# Patient Record
Sex: FEMALE | Race: WHITE | NOT HISPANIC OR LATINO | Employment: FULL TIME | URBAN - METROPOLITAN AREA
[De-identification: names, ages, dates, MRNs, and addresses within clinical notes are randomized per-mention and may not be internally consistent; named-entity substitution may affect disease eponyms.]

---

## 2019-07-31 ENCOUNTER — TELEPHONE (OUTPATIENT)
Dept: INTERNAL MEDICINE | Facility: CLINIC | Age: 44
End: 2019-07-31

## 2019-12-31 ENCOUNTER — OFFICE VISIT (OUTPATIENT)
Dept: ENDOCRINOLOGY | Facility: CLINIC | Age: 44
End: 2019-12-31

## 2019-12-31 VITALS
HEART RATE: 92 BPM | SYSTOLIC BLOOD PRESSURE: 122 MMHG | DIASTOLIC BLOOD PRESSURE: 84 MMHG | BODY MASS INDEX: 26.23 KG/M2 | WEIGHT: 157.4 LBS | OXYGEN SATURATION: 98 % | HEIGHT: 65 IN

## 2019-12-31 DIAGNOSIS — E04.2 MULTIPLE THYROID NODULES: Primary | ICD-10-CM

## 2019-12-31 DIAGNOSIS — E03.8 CENTRAL HYPOTHYROIDISM: ICD-10-CM

## 2019-12-31 PROBLEM — L65.9 HAIR LOSS: Status: ACTIVE | Noted: 2019-12-31

## 2019-12-31 PROBLEM — Z79.890 POSTMENOPAUSAL HRT (HORMONE REPLACEMENT THERAPY): Status: ACTIVE | Noted: 2019-12-31

## 2019-12-31 LAB — T4 FREE SERPL-MCNC: 1.39 NG/DL (ref 0.93–1.7)

## 2019-12-31 PROCEDURE — 76536 US EXAM OF HEAD AND NECK: CPT | Performed by: INTERNAL MEDICINE

## 2019-12-31 PROCEDURE — 99214 OFFICE O/P EST MOD 30 MIN: CPT | Performed by: INTERNAL MEDICINE

## 2019-12-31 PROCEDURE — 84439 ASSAY OF FREE THYROXINE: CPT | Performed by: INTERNAL MEDICINE

## 2019-12-31 RX ORDER — SPIRONOLACTONE 100 MG/1
TABLET, FILM COATED ORAL
COMMUNITY
Start: 2019-11-22

## 2019-12-31 RX ORDER — ESTRADIOL 1 MG/1
TABLET ORAL
COMMUNITY
Start: 2019-10-10

## 2019-12-31 RX ORDER — LEVOTHYROXINE SODIUM 0.07 MG/1
TABLET ORAL
COMMUNITY
Start: 2019-11-22 | End: 2020-01-02 | Stop reason: SDUPTHER

## 2019-12-31 NOTE — PROGRESS NOTES
Chief Complaint   Patient presents with   • Thyroid Problem     Previous pt here today for follow up        HPI   Laurel Bob is a 44 y.o. female had concerns including Thyroid Problem (Previous pt here today for follow up).    Patient is here for follow-up on idiopathic central hypothyroidism and thyroid nodules.    Her last thyroid ultrasound was completed on 12/31/2018 and showed a right mid 1.8 x 1.3 x 1.4 cm nodule in the right lower 1.1 x 0.9 x 1.0 cm nodule.  These nodules were biopsied in 2017 and were benign.  She has a history of left lobectomy for benign nodules.    Currently she is on levothyroxine 75 mcg daily for central hypothyroidism.  Free T4 is due.  Patient had a complete evaluation including free T4 by dialysis which was slightly low with an inappropriately normal TSH.  Labs from 12/18/2018 showed TSH 1.76 with free T4 by dialysis minimally below normal at 1.0 (lower limit 1.1).  She had symptoms of hypothyroidism prior to being started on levothyroxine.  Other pituitary work-up including estradiol, FSH, LH, IGF-I, prolactin, cortisol, ACTH was all normal.  MRI was also normal    The following portions of the patient's history were reviewed and updated as appropriate: allergies, current medications, past family history, past medical history, past social history, past surgical history and problem list.    Review of Systems   Constitutional: Negative.    HENT: Positive for congestion and sinus pressure.    Eyes: Negative.    Respiratory: Negative.    Cardiovascular: Negative.    Gastrointestinal: Negative.    Endocrine: Negative.    Genitourinary: Negative.    Musculoskeletal: Positive for arthralgias, myalgias and neck stiffness.   Skin: Negative.    Allergic/Immunologic: Negative.    Neurological: Negative.    Hematological: Negative.    Psychiatric/Behavioral: Negative for depressed mood. The patient is nervous/anxious.       ROS was reviewed and verified. All other ROS negative.    BP  "122/84 (BP Location: Left arm, Patient Position: Sitting, Cuff Size: Adult)   Pulse 92   Ht 165.1 cm (65\")   Wt 71.4 kg (157 lb 6.4 oz)   SpO2 98%   Breastfeeding No   BMI 26.19 kg/m²      Physical Exam     Constitutional:  well developed; well nourished  no acute distress   ENT/Thyroid: no thyromegaly  no palpable nodules   Eyes: EOM intact  Conjunctiva: clear   Respiratory:  breathing is unlabored  clear to auscultation bilaterally   Cardiovascular:  regular rate and rhythm, S1, S2 normal, no murmur, click, rub or gallop   Chest:  Not performed.   Abdomen: Not performed.   : Not performed.   Musculoskeletal: negative findings:  ROM of all joints is normal, no deformities present   Skin: dry and warm   Neuro: normal without focal findings, mental status, speech normal, alert and oriented x3 and YENY   Psych: oriented to time, place and person, mood and affect are within normal limits     See HPI    Thyroid Ultrasound 12/31/19    Indication: Right lobe nodules  Comparison Imaging: Ultrasound report from 12/31/2018  Clinical History:  Left lobectomy 2003 for benign nodules, two right nodules biopsied in 2017 and were benign    Real time high resolution imaging of the thyroid gland was performed in transverse and longitudinal planes. Previous imaging reports were reviewed if available and compared to the current to assess stability.     Lobes:  The right lobe measured 4.4 cm L x 1.0 cm AP x 1.7 cm in TV dimension.    The left lobe and isthmus are surgically absent.    Thyroid gland is heterogeneous and contains multiple nodules.    Nodule 1   located in the right mid lobe and measures 0.9 x 0.6 x 1.0 cm (L X AP X TV).  This nodule is complex cystic, heterogeneous, hypoechoic with well-defined margins, and Grade II vascularity on Color Flow Doppler.  It has no artifacts    Nodule 2  is located in the right lower lobe and measures 1.8 x 0.8 x 1.1 cm (L X AP X TV).  This nodule is complex cystic, heterogeneous, " hypoechoic with well-defined margins, and Grade II vascularity on Color Flow Doppler.  It has no artifacts    These 2 nodules are nearly confluent on the current exam.    No pathologic lymph nodes were seen.    Impression:  Stable right lobe nodules measuring 0.9 x 0.6 x 1.0 (previously measured 1.1 x 0.9 x 1.0 cm) and right lower nodule measuring 1.8 x 0.8 x 1.1 cm (previously measured 1.8 x 1.3 x 1.4 cm).  Prior benign FNA of both nodules in 2017.     Recommendation:  Repeat ultrasound 1 year.      Assessment and Plan    Laurel was seen today for thyroid problem.    Diagnoses and all orders for this visit:    Multiple thyroid nodules  As above, stable right lobe nodules that they do appear to be more confluent on this year's exam than in prior years.  They have slightly decreased in size.  Benign FNA of both nodules in 2017.  Repeat ultrasound in 1 year.  -     US Thyroid    Central hypothyroidism  Idiopathic central hypothyroidism diagnosed based on an inappropriately normal TSH with a low free T4 (confirmed with free T4 by dialysis).  Patient had symptoms of hypothyroidism prior to treatment with levothyroxine and feels better on treatment.  Other pituitary evaluation including MRI was normal.  Continue monitoring free T4 at least yearly.  TSH should not be checked or used in the management of this patient's hypothyroidism.  -     T4, Free    Return in about 1 year (around 12/31/2020) for next scheduled follow up with thyroid ultrasound. The patient was instructed to contact the clinic with any interval questions or concerns.    Rosangela Chu, DO   Endocrinologist    Please note that portions of this document were completed with a voice recognition program. Efforts were made to edit the dictations, but occasionally words are mis-transcribed.

## 2020-01-02 DIAGNOSIS — E03.8 CENTRAL HYPOTHYROIDISM: Primary | ICD-10-CM

## 2020-01-02 RX ORDER — LEVOTHYROXINE SODIUM 0.07 MG/1
75 TABLET ORAL DAILY
Qty: 90 TABLET | Refills: 3 | Status: SHIPPED | OUTPATIENT
Start: 2020-01-02 | End: 2021-03-03

## 2020-09-03 ENCOUNTER — TELEPHONE (OUTPATIENT)
Dept: ENDOCRINOLOGY | Facility: CLINIC | Age: 45
End: 2020-09-03

## 2020-09-03 ENCOUNTER — OFFICE VISIT (OUTPATIENT)
Dept: ENDOCRINOLOGY | Facility: CLINIC | Age: 45
End: 2020-09-03

## 2020-09-03 VITALS
HEART RATE: 67 BPM | SYSTOLIC BLOOD PRESSURE: 126 MMHG | OXYGEN SATURATION: 98 % | BODY MASS INDEX: 27.36 KG/M2 | HEIGHT: 65 IN | WEIGHT: 164.2 LBS | DIASTOLIC BLOOD PRESSURE: 76 MMHG

## 2020-09-03 DIAGNOSIS — E04.2 MULTIPLE THYROID NODULES: Primary | ICD-10-CM

## 2020-09-03 DIAGNOSIS — E03.8 CENTRAL HYPOTHYROIDISM: ICD-10-CM

## 2020-09-03 PROCEDURE — 99214 OFFICE O/P EST MOD 30 MIN: CPT | Performed by: INTERNAL MEDICINE

## 2020-09-03 PROCEDURE — 76536 US EXAM OF HEAD AND NECK: CPT | Performed by: INTERNAL MEDICINE

## 2020-09-03 RX ORDER — LANSOPRAZOLE 15 MG/1
CAPSULE, DELAYED RELEASE ORAL
COMMUNITY
Start: 2020-08-31

## 2020-09-03 NOTE — PROGRESS NOTES
"Chief Complaint   Patient presents with   • Multiple Thyroid Nodules     Follow up & U/S        HPI   Laurel Bob is a 45 y.o. female had concerns including Multiple Thyroid Nodules (Follow up & U/S).      Pt had a left thyroidectomy and later was diagnosed with central hypothyroidism with inappropriately normal TSH in the setting of low free T4.     Symptoms of hypothyroidism improved once levothyroxine was started.   PCP checked labs recently and showed free T4 1.58. TSH was also checked and because it was low PCP recommended dose reduction.     She is here for repeat thyroid ultrasound today.   Has right lobe nodules that were biopsied in 2017 and were benign.     The following portions of the patient's history were reviewed and updated as appropriate: allergies, current medications, past family history, past medical history, past social history, past surgical history and problem list.    Review of Systems   Constitutional: Positive for fatigue and unexpected weight gain.   HENT: Negative.    Eyes: Negative.    Respiratory: Negative.    Cardiovascular: Negative.    Gastrointestinal: Positive for GERD.   Endocrine: Negative.    Genitourinary: Negative.    Musculoskeletal: Negative.    Skin: Negative.    Allergic/Immunologic: Negative.    Neurological: Negative.    Hematological: Negative.    Psychiatric/Behavioral: Positive for stress.      ROS was reviewed and verified. All other ROS negative.    /76   Pulse 67   Ht 165.1 cm (65\")   Wt 74.5 kg (164 lb 3.2 oz)   SpO2 98%   BMI 27.32 kg/m²      Physical Exam     Constitutional:  well developed; well nourished  no acute distress   ENT/Thyroid: no thyromegaly, left lobe surgically absent  no palpable nodules   Eyes: EOM intact  Conjunctiva: clear   Respiratory:  breathing is unlabored  clear to auscultation bilaterally   Cardiovascular:  regular rate and rhythm, S1, S2 normal, no murmur, click, rub or gallop   Chest:  Not performed.   Abdomen: Not " performed.   : Not performed.   Musculoskeletal: negative findings:  ROM of all joints is normal, no deformities present   Skin: dry and warm   Neuro: normal without focal findings and mental status, speech normal, alert and oriented x3   Psych: oriented to time, place and person, mood and affect are within normal limits       T4  Lab Results   Component Value Date    FREET4 1.39 12/31/2019       Thyroid Ultrasound 09/03/20    Indication: thyroid nodules  Comparison Imaging: US 12/2019  Clinical History: left lobectomy, right lobe nodules biopsied and benign 2017    Real time high resolution imaging of the thyroid gland was performed in transverse and longitudinal planes. Previous imaging reports were reviewed if available and compared to the current to assess stability.     Lobes:  The right lobe measured 4.3 cm L x 1.2 cm AP x 1.7 cm in TV dimension.    The isthmus measured 0.1 cm in thickness.    The left thyroid lobe is surgically absent and no remnant thyroid tissue is noted.    Thyroid gland is heterogeneous and contains a single right lobe nodule.    Nodule 1   located in the right mid lobe and measures 1.8 x 1.1 x 1.3 cm (L X AP X TV).  This nodule is complex cystic, heterogeneous, hypoechoic with well-defined margins, and Grade II vascularity on Color Flow Doppler.  It has no artifacts.    No pathologic lymph nodes were seen.    Impression:  Right lobe nodule complex solid and cystic measuring 1.8 x 1.1 x 1.3 cm.  What was previously measuring as 2 separate nodules in the right lobe now appears to be 1 congruent nodule.  In hindsight, the 2 separate nodules may have been due to central septation in a larger nodule that has since decreased in size.    Recommendation:  Repeat ultrasound in 1 year      Assessment and Plan    Laurel was seen today for multiple thyroid nodules.    Diagnoses and all orders for this visit:    Multiple thyroid nodules  See detailed ultrasound report as above.  She now has only a  single right lobe nodule measuring 1.8 x 1.1 x 1.3 cm.  Previously had 2 right lobe nodules which in hindsight may have been a single nodule with a thick septation giving the appearance of 2 nodules.  Despite that, the nodule is measuring smaller than the 2 nodules would have together and measures similarly to the largest of the two nodules previously. Those two nodules were biopsied in 2017 and were benign.  No repeat FNA is indicated.   Repeat US 1 year. If stable, decrease frequency of monitoring.  -     US Thyroid    Central hypothyroidism  Patient has a history of central hypothyroidism with inappropriately normal TSH in the setting of low T4 with symptoms of hypothyroidism, resolved on treatment.  Work-up at that time included evaluation of all of the pituitary axes and MRI, which were all normal.  It would appear she has idiopathic central hypothyroidism.  TSH can sometimes suppress with initiation of T4 therapy, although it may not respond normally in the setting of low T4.  Her TSH should not be monitored or checked and hypothyroidism should be managed solely based on free T4 levels.  Clinically the patient displays no symptoms of hyperthyroidism.  Continue levothyroxine 75 mcg daily and recheck free T4 only yearly.    Return in about 1 year (around 9/3/2021) for next scheduled follow up with thyroid ultrasound. The patient was instructed to contact the clinic with any interval questions or concerns.    Rosangela Chu, DO   Endocrinologist    Please note that portions of this document were completed with a voice recognition program. Efforts were made to edit the dictations, but occasionally words are mis-transcribed.

## 2020-09-04 ENCOUNTER — TELEPHONE (OUTPATIENT)
Dept: ENDOCRINOLOGY | Facility: CLINIC | Age: 45
End: 2020-09-04

## 2020-09-04 NOTE — TELEPHONE ENCOUNTER
Fax request to:  Sunitha Flores NP  Fax:  [phone], Phone:  [phone]    Requested:  Recent labs from July, 2020

## 2020-09-04 NOTE — TELEPHONE ENCOUNTER
----- Message from Rosangela Chu DO sent at 9/3/2020  3:48 PM EDT -----  Regarding: labs   Please request labs from July from PCPs office. TY!

## 2020-09-04 NOTE — TELEPHONE ENCOUNTER
Spoke with Indy, from Medical records at [facility].  She will fax us labs for patient from July, 2020.  Phone:  [phone]

## 2020-09-04 NOTE — TELEPHONE ENCOUNTER
Indy states, pt does not have labs just two covid-19 tests.  Patient has not seen Sunitha Li since 2007.

## 2020-09-08 NOTE — TELEPHONE ENCOUNTER
Mary from medical records at [facility] will fax the labs again to us:  Provided fax:  [phone]

## 2020-09-08 NOTE — TELEPHONE ENCOUNTER
Spoke with Mary, from Medical records at [facility].  She will fax us labs for patient from July, 2020.  Phone:  [phone]

## 2020-09-25 NOTE — TELEPHONE ENCOUNTER
Spoke with Liam, from medical records, he will send the labs they have for patient, but they are from July of 2019.

## 2021-03-03 DIAGNOSIS — E03.8 CENTRAL HYPOTHYROIDISM: ICD-10-CM

## 2021-03-03 RX ORDER — LEVOTHYROXINE SODIUM 0.07 MG/1
TABLET ORAL
Qty: 90 TABLET | Refills: 2 | Status: SHIPPED | OUTPATIENT
Start: 2021-03-03 | End: 2021-08-12 | Stop reason: SDUPTHER

## 2021-05-03 DIAGNOSIS — E03.8 CENTRAL HYPOTHYROIDISM: Primary | ICD-10-CM

## 2021-08-12 ENCOUNTER — OFFICE VISIT (OUTPATIENT)
Dept: ENDOCRINOLOGY | Facility: CLINIC | Age: 46
End: 2021-08-12

## 2021-08-12 VITALS
HEIGHT: 64 IN | BODY MASS INDEX: 27.31 KG/M2 | OXYGEN SATURATION: 98 % | DIASTOLIC BLOOD PRESSURE: 68 MMHG | HEART RATE: 84 BPM | SYSTOLIC BLOOD PRESSURE: 114 MMHG | WEIGHT: 160 LBS

## 2021-08-12 DIAGNOSIS — E04.1 THYROID NODULE: ICD-10-CM

## 2021-08-12 DIAGNOSIS — E11.9 TYPE 2 DIABETES MELLITUS WITHOUT COMPLICATION, WITHOUT LONG-TERM CURRENT USE OF INSULIN (HCC): ICD-10-CM

## 2021-08-12 DIAGNOSIS — E03.8 CENTRAL HYPOTHYROIDISM: Primary | ICD-10-CM

## 2021-08-12 PROCEDURE — 76536 US EXAM OF HEAD AND NECK: CPT | Performed by: INTERNAL MEDICINE

## 2021-08-12 PROCEDURE — 99214 OFFICE O/P EST MOD 30 MIN: CPT | Performed by: INTERNAL MEDICINE

## 2021-08-12 RX ORDER — LEVOTHYROXINE SODIUM 0.07 MG/1
75 TABLET ORAL DAILY
Qty: 90 TABLET | Refills: 3 | Status: SHIPPED | OUTPATIENT
Start: 2021-08-12 | End: 2022-08-11 | Stop reason: SDUPTHER

## 2022-08-09 ENCOUNTER — LAB (OUTPATIENT)
Dept: LAB | Facility: HOSPITAL | Age: 47
End: 2022-08-09

## 2022-08-09 ENCOUNTER — OFFICE VISIT (OUTPATIENT)
Dept: ENDOCRINOLOGY | Facility: CLINIC | Age: 47
End: 2022-08-09

## 2022-08-09 VITALS
OXYGEN SATURATION: 98 % | HEIGHT: 64 IN | HEART RATE: 78 BPM | SYSTOLIC BLOOD PRESSURE: 116 MMHG | DIASTOLIC BLOOD PRESSURE: 80 MMHG | BODY MASS INDEX: 28.68 KG/M2 | WEIGHT: 168 LBS

## 2022-08-09 DIAGNOSIS — L65.9 HAIR LOSS: ICD-10-CM

## 2022-08-09 DIAGNOSIS — E11.9 TYPE 2 DIABETES MELLITUS WITHOUT COMPLICATION, WITHOUT LONG-TERM CURRENT USE OF INSULIN: ICD-10-CM

## 2022-08-09 DIAGNOSIS — E04.1 THYROID NODULE: ICD-10-CM

## 2022-08-09 DIAGNOSIS — E03.8 CENTRAL HYPOTHYROIDISM: Primary | ICD-10-CM

## 2022-08-09 LAB — HBA1C MFR BLD: 5.8 % (ref 4.8–5.6)

## 2022-08-09 PROCEDURE — 99214 OFFICE O/P EST MOD 30 MIN: CPT | Performed by: INTERNAL MEDICINE

## 2022-08-09 PROCEDURE — 83036 HEMOGLOBIN GLYCOSYLATED A1C: CPT | Performed by: INTERNAL MEDICINE

## 2022-08-09 PROCEDURE — 84439 ASSAY OF FREE THYROXINE: CPT | Performed by: INTERNAL MEDICINE

## 2022-08-09 NOTE — PROGRESS NOTES
"Chief Complaint   Patient presents with   • Hypothyroidism        HPI   Laurel Bob is a 46 y.o. female had concerns including Hypothyroidism.      Having occasional hot flashes.   Preferred the topical estrogen.   Sees Crystal Brown.    Weight is up 8 lbs since last year.   Isn't following her diet as closely.   Sleep is good.   Is due for A1c.    Is doing intermittent fasting - until lunch.   Notes worsening GERD with sugar or water. Recalls being told she had a hiatal hernia.   Didn't tolerate metformin in the past.     Still having issues with hair loss. Is on spironolactone.    Is on levothyroxine 75 mcg daily. Taking as directed with no missed doses.    The following portions of the patient's history were reviewed and updated as appropriate: allergies, current medications, past family history, past medical history, past social history, past surgical history and problem list.    Review of Systems   Constitutional: Negative.    Endocrine: Negative.         Hair loss, hot flashes        /80   Pulse 78   Ht 162.6 cm (64\")   Wt 76.2 kg (168 lb)   SpO2 98%   BMI 28.84 kg/m²      Physical Exam  Vitals reviewed.   Constitutional:       Appearance: Normal appearance.   Neck:      Thyroid: No thyroid mass or thyromegaly.   Cardiovascular:      Rate and Rhythm: Normal rate.   Pulmonary:      Effort: Pulmonary effort is normal.   Neurological:      General: No focal deficit present.      Mental Status: She is alert. Mental status is at baseline.   Psychiatric:         Mood and Affect: Mood normal.         Behavior: Behavior normal.          LABS AND IMAGING     T4  Lab Results   Component Value Date    FREET4 1.39 12/31/2019     Thyroid Ultrasound 08/12/21     Indication: Right thyroid nodule  Comparison Imaging: Ultrasound September 2020  Clinical History:  Left lobectomy for thyroid nodules, right nodule (previously thought to be 2 distinct nodules in parentheses biopsied in 2017 and found to be " benign     Real time high resolution imaging of the thyroid gland was performed in transverse and longitudinal planes. Previous imaging reports were reviewed if available and compared to the current to assess stability.      Lobes:  The right lobe measured 5.0 cm L x 1.2 cm AP x 1.5 cm in TV dimension.    The left thyroid lobe is surgically absent     Thyroid gland is heterogeneous and contains a single right lobe nodule.     Nodule 1   located in the right mid lobe and measures 1.5 x 1.0 x 1.1 cm (L X AP X TV).  This nodule is complex cystic, heterogeneous, hypoechoic with well-defined margins, and Grade II vascularity on Color Flow Doppler.  It has no artifacts.  This nodule previously measured 1.8 x 1.1 x 1.3 cm in 2020.     No pathologic lymph nodes were seen.     Impression:  Right lobe nodule decreasing in size now measuring 1.5 x 1.0 x 1.1 cm.  This nodule previously measured 1.8 x 1.1 x 1.3 cm.  Was biopsied as 2 separate nodules in 2017.     Recommendation:  Repeat ultrasound in 2 years.         Assessment and Plan    Diagnoses and all orders for this visit:    1. Central hypothyroidism (Primary)  Clinically thyroid on levothyroxine 75 mcg daily.  Check free T4 today.  -     T4, Free    2. Thyroid nodule  Right lobe nodule decreased in size in 2021 measuring 1.5 x 1.0 x 1.1 cm.  Was biopsied when suspected to be 2 separate nodules in 2017 and was found to be benign.  Repeat ultrasound next year.    3. Type 2 diabetes mellitus without complication, without long-term current use of insulin (HCC)  A1c 6.5 when last checked.  Recheck A1c today.  History of intolerance to metformin.  No GLP-1 agonist due to uncontrolled reflux.  Exercise/dietary modification/weight loss discussed.  -     Hemoglobin A1c    4. Hair loss  On spironolactone. Checking thyroid as above. PT is not taking biotin.     Return in about 1 year (around 8/9/2023) for next scheduled follow up, with thyroid ultrasound ** 30 min appt. The  patient was instructed to contact the clinic with any interval questions or concerns.    Rosangela Chu, DO   Endocrinologist    Please note that portions of this note were completed with a voice recognition program.

## 2022-08-10 ENCOUNTER — TELEPHONE (OUTPATIENT)
Dept: ENDOCRINOLOGY | Facility: CLINIC | Age: 47
End: 2022-08-10

## 2022-08-10 DIAGNOSIS — E03.8 CENTRAL HYPOTHYROIDISM: ICD-10-CM

## 2022-08-10 LAB — T4 FREE SERPL-MCNC: 1.26 NG/DL (ref 0.93–1.7)

## 2022-08-10 NOTE — TELEPHONE ENCOUNTER
PATIENT RETURNED CALL REGARDING THYROID MED DOSE INCREASE SUGGESTION TO 88 MCG. PATIENT IS AGREEABLE WITH THIS INCREASE AND WOULD LIKE TO HAVE RX SENT TO [facility].

## 2022-08-11 RX ORDER — LEVOTHYROXINE SODIUM 88 UG/1
88 TABLET ORAL DAILY
Qty: 90 TABLET | Refills: 1 | Status: SHIPPED | OUTPATIENT
Start: 2022-08-11 | End: 2023-02-10

## 2022-09-10 DIAGNOSIS — E03.8 CENTRAL HYPOTHYROIDISM: ICD-10-CM

## 2022-09-12 RX ORDER — LEVOTHYROXINE SODIUM 0.07 MG/1
TABLET ORAL
Qty: 90 TABLET | Refills: 3 | OUTPATIENT
Start: 2022-09-12

## 2023-02-10 DIAGNOSIS — E03.8 CENTRAL HYPOTHYROIDISM: ICD-10-CM

## 2023-02-10 RX ORDER — LEVOTHYROXINE SODIUM 88 UG/1
TABLET ORAL
Qty: 90 TABLET | Refills: 2 | Status: SHIPPED | OUTPATIENT
Start: 2023-02-10

## 2023-08-03 ENCOUNTER — OFFICE VISIT (OUTPATIENT)
Dept: ENDOCRINOLOGY | Facility: CLINIC | Age: 48
End: 2023-08-03
Payer: COMMERCIAL

## 2023-08-03 VITALS
HEART RATE: 92 BPM | DIASTOLIC BLOOD PRESSURE: 72 MMHG | HEIGHT: 64 IN | WEIGHT: 175 LBS | OXYGEN SATURATION: 99 % | BODY MASS INDEX: 29.88 KG/M2 | SYSTOLIC BLOOD PRESSURE: 122 MMHG

## 2023-08-03 DIAGNOSIS — R73.03 PREDIABETES: ICD-10-CM

## 2023-08-03 DIAGNOSIS — E03.8 CENTRAL HYPOTHYROIDISM: Primary | ICD-10-CM

## 2023-08-03 DIAGNOSIS — E04.1 THYROID NODULE: ICD-10-CM

## 2023-08-03 LAB
ALBUMIN SERPL-MCNC: 4.1 G/DL (ref 3.5–5.2)
ALBUMIN UR-MCNC: 28.2 MG/DL
ALBUMIN/GLOB SERPL: 1.4 G/DL
ALP SERPL-CCNC: 86 U/L (ref 39–117)
ALT SERPL W P-5'-P-CCNC: 17 U/L (ref 1–33)
ANION GAP SERPL CALCULATED.3IONS-SCNC: 13.6 MMOL/L (ref 5–15)
AST SERPL-CCNC: 18 U/L (ref 1–32)
BILIRUB SERPL-MCNC: 0.3 MG/DL (ref 0–1.2)
BUN SERPL-MCNC: 8 MG/DL (ref 6–20)
BUN/CREAT SERPL: 8.2 (ref 7–25)
CALCIUM SPEC-SCNC: 9.5 MG/DL (ref 8.6–10.5)
CHLORIDE SERPL-SCNC: 102 MMOL/L (ref 98–107)
CO2 SERPL-SCNC: 25.4 MMOL/L (ref 22–29)
CREAT SERPL-MCNC: 0.97 MG/DL (ref 0.57–1)
CREAT UR-MCNC: 106.6 MG/DL
EGFRCR SERPLBLD CKD-EPI 2021: 72.7 ML/MIN/1.73
GLOBULIN UR ELPH-MCNC: 2.9 GM/DL
GLUCOSE SERPL-MCNC: 113 MG/DL (ref 65–99)
HBA1C MFR BLD: 6 % (ref 4.8–5.6)
MICROALBUMIN/CREAT UR: 264.5 MG/G
POTASSIUM SERPL-SCNC: 3.8 MMOL/L (ref 3.5–5.2)
PROT SERPL-MCNC: 7 G/DL (ref 6–8.5)
SODIUM SERPL-SCNC: 141 MMOL/L (ref 136–145)
T4 FREE SERPL-MCNC: 1.3 NG/DL (ref 0.93–1.7)

## 2023-08-03 PROCEDURE — 82043 UR ALBUMIN QUANTITATIVE: CPT | Performed by: INTERNAL MEDICINE

## 2023-08-03 PROCEDURE — 80053 COMPREHEN METABOLIC PANEL: CPT | Performed by: INTERNAL MEDICINE

## 2023-08-03 PROCEDURE — 84439 ASSAY OF FREE THYROXINE: CPT | Performed by: INTERNAL MEDICINE

## 2023-08-03 PROCEDURE — 82570 ASSAY OF URINE CREATININE: CPT | Performed by: INTERNAL MEDICINE

## 2023-08-03 PROCEDURE — 83036 HEMOGLOBIN GLYCOSYLATED A1C: CPT | Performed by: INTERNAL MEDICINE

## 2023-08-07 DIAGNOSIS — E03.8 CENTRAL HYPOTHYROIDISM: ICD-10-CM

## 2023-08-07 RX ORDER — LEVOTHYROXINE SODIUM 88 UG/1
88 TABLET ORAL DAILY
Qty: 90 TABLET | Refills: 3 | Status: SHIPPED | OUTPATIENT
Start: 2023-08-07

## 2024-08-01 ENCOUNTER — OFFICE VISIT (OUTPATIENT)
Dept: ENDOCRINOLOGY | Facility: CLINIC | Age: 49
End: 2024-08-01
Payer: COMMERCIAL

## 2024-08-01 VITALS
OXYGEN SATURATION: 100 % | HEART RATE: 74 BPM | HEIGHT: 64 IN | WEIGHT: 156 LBS | BODY MASS INDEX: 26.63 KG/M2 | DIASTOLIC BLOOD PRESSURE: 76 MMHG | SYSTOLIC BLOOD PRESSURE: 124 MMHG

## 2024-08-01 DIAGNOSIS — E03.8 CENTRAL HYPOTHYROIDISM: Primary | ICD-10-CM

## 2024-08-01 DIAGNOSIS — R73.03 PREDIABETES: ICD-10-CM

## 2024-08-01 DIAGNOSIS — Z79.890 POSTMENOPAUSAL HRT (HORMONE REPLACEMENT THERAPY): ICD-10-CM

## 2024-08-01 DIAGNOSIS — E04.1 THYROID NODULE: ICD-10-CM

## 2024-08-01 PROCEDURE — 99214 OFFICE O/P EST MOD 30 MIN: CPT | Performed by: INTERNAL MEDICINE

## 2024-08-01 RX ORDER — ESTRADIOL 2 MG/1
2 TABLET ORAL DAILY
COMMUNITY
Start: 2024-04-28

## 2024-08-01 RX ORDER — AZELASTINE HYDROCHLORIDE 137 UG/1
SPRAY, METERED NASAL
COMMUNITY
Start: 2024-04-23

## 2024-08-01 RX ORDER — ONDANSETRON 4 MG/1
4 TABLET, ORALLY DISINTEGRATING ORAL
COMMUNITY
Start: 2024-07-22

## 2024-08-01 RX ORDER — LEVOTHYROXINE SODIUM 88 UG/1
88 TABLET ORAL DAILY
Qty: 90 TABLET | Refills: 3 | Status: SHIPPED | OUTPATIENT
Start: 2024-08-01

## 2024-08-01 RX ORDER — LEVOCETIRIZINE DIHYDROCHLORIDE 5 MG/1
5 TABLET ORAL
COMMUNITY
Start: 2024-05-06

## 2024-08-01 RX ORDER — TIRZEPATIDE 5 MG/.5ML
5 INJECTION, SOLUTION SUBCUTANEOUS
COMMUNITY
Start: 2024-07-29

## 2024-08-01 NOTE — PROGRESS NOTES
"Chief Complaint   Patient presents with    Thyroid Problem     Central hypothyroidism          HPI   Laurel Bob is a 48 y.o. female had concerns including Thyroid Problem (Central hypothyroidism/).      For follow-up on hypothyroidism and prediabetes.  She has lost almost 20 pounds since last year.  Is on the second dose of zepbound. Goal weight 140. She has made major changes to her diet - more protein, hunger is much reduced. She has struggled with constipation.     Inquires about hormone testing (cortisol, estradiol).   She is on estradiol 2 mg daily.   Sunitha Li is prescribing.   Has a lot of stress in her life - prompting her concern about cortisol levels.       The following portions of the patient's history were reviewed and updated as appropriate: allergies, current medications, past family history, past medical history, past social history, past surgical history, and problem list.    Review of Systems   Constitutional:  Positive for appetite change.   Gastrointestinal:  Positive for constipation.        /76 (BP Location: Left arm, Patient Position: Sitting, Cuff Size: Adult)   Pulse 74   Ht 162.6 cm (64\")   Wt 70.8 kg (156 lb)   SpO2 100%   BMI 26.78 kg/m²      Physical Exam  Vitals reviewed.   Constitutional:       Appearance: Normal appearance.   Cardiovascular:      Rate and Rhythm: Normal rate.   Pulmonary:      Effort: Pulmonary effort is normal.   Neurological:      General: No focal deficit present.      Mental Status: She is alert. Mental status is at baseline.   Psychiatric:         Mood and Affect: Mood normal.         Behavior: Behavior normal.              LABS AND IMAGING   CMP  Lab Results   Component Value Date    GLUCOSE 113 (H) 08/03/2023    BUN 8 08/03/2023    CREATININE 0.97 08/03/2023    BCR 8.2 08/03/2023    K 3.8 08/03/2023    CO2 25.4 08/03/2023    CALCIUM 9.5 08/03/2023    ALBUMIN 4.1 08/03/2023    AST 18 08/03/2023    ALT 17 08/03/2023      T4  Lab Results "   Component Value Date    FREET4 1.30 08/03/2023    FREET4 1.26 08/09/2022    FREET4 1.39 12/31/2019     Hemoglobin A1C   Date Value Ref Range Status   08/03/2023 6.00 (H) 4.80 - 5.60 % Final     4/8/24 Cr 0.8 GFR > 60, total cholesterol 208, , HDL 62, , free T4 1.27, TSH < 0.1, A1c 5.7    Thyroid Ultrasound 08/03/23     Indication: Thyroid nodule  Comparison Imaging: Ultrasound 2020, 2021  Clinical History: Left lobectomy for thyroid nodules, right lobe nodule with history of FNA in 2017     Real time high resolution imaging of the thyroid gland was performed in transverse and longitudinal planes. Previous imaging reports were reviewed if available and compared to the current to assess stability.      Lobes:  The right lobe measured 5.4 cm L x 1.1 cm AP x 1.3 cm in TV dimension.    The isthmus measured 0.1 cm in thickness.    The left thyroid is surgically absent     Thyroid gland is heterogeneous and contains single nodules.     Nodule 1   located in the right mid lobe and measures 1.3 x 0.8 x 0.9 cm (L X AP X TV).  This nodule is solid, heterogeneous, hypoechoic with indistinct margins, and Grade I vascularity on Color Flow Doppler.  It has no artifacts.  This nodule previously measured 1.5 x 1.0 x 1.1 cm.     Subcentimeter cyst in the right lobe noted.     No pathologic lymph nodes were seen.     Impression:  Left lobe surgically absent.  Right lobe slightly heterogeneous with a nodule measuring 1.3 cm, indistinct margins, may be more consistent with pseudonodule.  Also noted subcentimeter cyst.  No suspicious lymph nodes.     Recommendation:  Repeat ultrasound in 2 years.        Assessment and Plan    Diagnoses and all orders for this visit:    1. Central hypothyroidism (Primary)  Confirmed in the past with low T4 levels despite low/normal TSH.  TSH should not be monitored/used to titrate dose.    Clinically euthyroid on levothyroxine 88 mcg daily.  Free T4 today normal 4/8/24. Refill sent.   -      levothyroxine (SYNTHROID, LEVOTHROID) 88 MCG tablet; Take 1 tablet by mouth Daily.  Dispense: 90 tablet; Refill: 3    2. Thyroid nodule  See detailed ultrasound report as above. Right lobe nodule has continued to decrease in size, last measuring 1.3 cm. History of benign FNA in 2017 when it was more consistent with 2 separate nodules.  US 2 years from the last - next in 2025.    3. Prediabetes  Diet controlled. Has lost weight with zepbound.   A1c last down to 5.7. Continue efforts at weight loss. I expect she can reverse her prediabetes with continued weight loss and dietary changes.    4. Postmenopausal HRT (hormone replacement therapy)  On estradiol tabs. Past hysterectomy.   Labs testing while on therapy isn't indicated. Management based on symptoms control and keeping lowest dose necessary to control symptoms. Consider dose reduction/taper. Discuss with GYN provider.   She displays no s/s of cushing's or adrenal insufficiency. Cortisol testing isn't indicated.        Return in about 1 year (around 8/1/2025) for next scheduled follow up, with thyroid ultrasound ** 30 min appt. The patient was instructed to contact the clinic with any interval questions or concerns.    Electronically signed by: Rosangela Chu DO   Endocrinologist    Please note that portions of this note were completed with a voice recognition program.

## 2025-08-04 ENCOUNTER — OFFICE VISIT (OUTPATIENT)
Dept: ENDOCRINOLOGY | Facility: CLINIC | Age: 50
End: 2025-08-04
Payer: COMMERCIAL

## 2025-08-04 VITALS
HEART RATE: 83 BPM | SYSTOLIC BLOOD PRESSURE: 106 MMHG | OXYGEN SATURATION: 100 % | BODY MASS INDEX: 24.48 KG/M2 | HEIGHT: 64 IN | DIASTOLIC BLOOD PRESSURE: 66 MMHG | WEIGHT: 143.4 LBS

## 2025-08-04 DIAGNOSIS — E04.2 MULTIPLE THYROID NODULES: ICD-10-CM

## 2025-08-04 DIAGNOSIS — R73.03 PREDIABETES: ICD-10-CM

## 2025-08-04 DIAGNOSIS — E03.8 CENTRAL HYPOTHYROIDISM: Primary | ICD-10-CM

## 2025-08-04 PROBLEM — E04.1 THYROID NODULE: Status: ACTIVE | Noted: 2025-08-04

## 2025-08-04 PROBLEM — L65.9 HAIR LOSS: Status: RESOLVED | Noted: 2019-12-31 | Resolved: 2025-08-04

## 2025-08-04 PROBLEM — E04.1 THYROID NODULE: Status: RESOLVED | Noted: 2025-08-04 | Resolved: 2025-08-04

## 2025-08-04 LAB
EXPIRATION DATE: NORMAL
EXPIRATION DATE: NORMAL
GLUCOSE BLDC GLUCOMTR-MCNC: 109 MG/DL (ref 70–130)
HBA1C MFR BLD: 5.2 % (ref 4.5–5.7)
Lab: NORMAL
Lab: NORMAL

## 2025-08-04 PROCEDURE — 83036 HEMOGLOBIN GLYCOSYLATED A1C: CPT | Performed by: INTERNAL MEDICINE

## 2025-08-04 PROCEDURE — 76536 US EXAM OF HEAD AND NECK: CPT | Performed by: INTERNAL MEDICINE

## 2025-08-04 PROCEDURE — 99213 OFFICE O/P EST LOW 20 MIN: CPT | Performed by: INTERNAL MEDICINE

## 2025-08-04 PROCEDURE — 82947 ASSAY GLUCOSE BLOOD QUANT: CPT | Performed by: INTERNAL MEDICINE

## 2025-08-04 RX ORDER — SEMAGLUTIDE 0.25 MG/.5ML
0.25 INJECTION, SOLUTION SUBCUTANEOUS WEEKLY
COMMUNITY
Start: 2025-07-15

## 2025-08-04 RX ORDER — LEVOTHYROXINE SODIUM 88 UG/1
88 TABLET ORAL DAILY
Qty: 90 TABLET | Refills: 3 | Status: SHIPPED | OUTPATIENT
Start: 2025-08-04